# Patient Record
Sex: MALE | Race: WHITE | NOT HISPANIC OR LATINO | URBAN - METROPOLITAN AREA
[De-identification: names, ages, dates, MRNs, and addresses within clinical notes are randomized per-mention and may not be internally consistent; named-entity substitution may affect disease eponyms.]

---

## 2020-01-08 ENCOUNTER — OFFICE VISIT (OUTPATIENT)
Dept: DERMATOLOGY | Facility: CLINIC | Age: 60
End: 2020-01-08

## 2020-01-08 DIAGNOSIS — L30.9 DERMATITIS: Primary | ICD-10-CM

## 2020-01-08 RX ORDER — HYDROXYZINE HYDROCHLORIDE 25 MG/1
25-50 TABLET, FILM COATED ORAL
COMMUNITY
Start: 2020-01-02 | End: 2020-01-16

## 2020-01-08 RX ORDER — PREDNISONE 20 MG/1
TABLET ORAL
COMMUNITY
Start: 2020-01-02 | End: 2020-01-08

## 2020-01-08 RX ORDER — TRIAMCINOLONE ACETONIDE 1 MG/G
OINTMENT TOPICAL 2 TIMES DAILY
Qty: 80 G | Refills: 5 | Status: SHIPPED | OUTPATIENT
Start: 2020-01-08 | End: 2024-04-27

## 2020-01-08 ASSESSMENT — PAIN SCALES - GENERAL: PAINLEVEL: NO PAIN (0)

## 2020-01-08 NOTE — LETTER
1/8/2020       RE: Cristino Borges  [address] [place] [address]     Dear Colleague,    Thank you for referring your patient, Cristino Borges, to the [facility] DERMATOLOGY at [facility]. Please see a copy of my visit note below.    [facility] Dermatology Note    Dermatology Problem List:  1. Eczematous eruption on the extensor arms, back  -Triamcinolone 0.1% ointment BID PRN    Encounter Date: Jan 8, 2020    CC: Skin eruption    History of Present Illness:  Mr. Cristino Borges is a 59 year old male presenting for skin eruption  Was evaluated by primary care at [facility] 1/2/20 and referred for urgent dermatology visit; placed on prednisone for 3 days, hydroxyzine  Eruption on back, arms, hands; itchy in nature; present for 1 month, slightly improved with hydrocortisone, no environmental or occupational exposures, no recent travel, no fevers/chills/night sweats  Patient states that everything is much improved with prednisone, still having some mild pruritis; started on the left lower medial leg as one patch that was itchy, started about 1 months ago; felt a little bit sick with a virus about 1 month ago after returning from [place] and visiting parents; was using over-the-counter cortisone which has helped slightly; otherwise feeling wel    Past Medical History:   Otherwise healthy    Social History:  Patient reports that he has never smoked. He has never used smokeless tobacco.    Family History:  Family History   Problem Relation Age of Onset     Skin Cancer No family hx of      Melanoma No family hx of        Medications:  Current Outpatient Medications   Medication Sig Dispense Refill     hydrOXYzine (ATARAX) 25 MG tablet Take 25-50 mg by mouth       predniSONE (DELTASONE) 20 MG tablet           Allergies:  No Known Allergies    Review of Systems:  Constitutional: Otherwise feeling well today, in usual state of health.  HEENT: Patient denies  nonhealing oral sores.    Physical exam:  Vitals: There were no vitals taken for this visit.  General: In no acute distress, well-developed, well-nourished  Eyes: Conjunctivae clear  Pulmonary: Breathing comfortably in no distress  CV: Well-perfused, no cyanosis  Extremities: No deformity, no edema  Lymphatic: No lymphadenopathy    Skin:   Gavin II  -face  -neck  -abdomen  -back  -bilateral upper extremities  -bilateral lower extremities    Faint eczematous pink papules on the bilateral lower back and extensor arms with peripheral brown macules indicative of post-inflammatory hyperpigmentation    Impression/Plan:  1. Eczematous eruption on the extensor arms, back  Differential includes eczematous dermatitis including disseminated id reaction, pityriasis rosea, viral exanthem  Discussed the various possible etiologies and option to have stronger topical steroid to use if needed and possibility of rebound after stopping prednisone  -Gentle skin care  -Triamcinolone 0.1% ointment BID PRN    CC Referred Self, MD  No address on file on close of this encounter.    Follow-up as needed for new or changing lesions- He will MYCHART if reflares    Faculty: Dr. Gonzalez    Staff Involved:  Resident/Staff    Rhonda Judd MD  Dermatology Resident  [facility]  I, Layne Gonzalez MD, saw this patient with the resident and agree with the resident s findings and plan of care as documented in the resident s note.      Again, thank you for allowing me to participate in the care of your patient.      Sincerely,    Layne Gonzalez MD

## 2020-01-08 NOTE — LETTER
Date:January 10, 2020      Patient was self referred, no letter generated. Do not send.        [facility] Health Information

## 2020-01-08 NOTE — PROGRESS NOTES
[facility] Dermatology Note    Dermatology Problem List:  1. Eczematous eruption on the extensor arms, back  -Triamcinolone 0.1% ointment BID PRN    Encounter Date: Jan 8, 2020    CC: Skin eruption    History of Present Illness:  Mr. Cristino Borges is a 59 year old male presenting for skin eruption  Was evaluated by primary care at [facility] 1/2/20 and referred for urgent dermatology visit; placed on prednisone for 3 days, hydroxyzine  Eruption on back, arms, hands; itchy in nature; present for 1 month, slightly improved with hydrocortisone, no environmental or occupational exposures, no recent travel, no fevers/chills/night sweats  Patient states that everything is much improved with prednisone, still having some mild pruritis; started on the left lower medial leg as one patch that was itchy, started about 1 months ago; felt a little bit sick with a virus about 1 month ago after returning from [place] and visiting parents; was using over-the-counter cortisone which has helped slightly; otherwise feeling wel    Past Medical History:   Otherwise healthy    Social History:  Patient reports that he has never smoked. He has never used smokeless tobacco.    Family History:  Family History   Problem Relation Age of Onset     Skin Cancer No family hx of      Melanoma No family hx of        Medications:  Current Outpatient Medications   Medication Sig Dispense Refill     hydrOXYzine (ATARAX) 25 MG tablet Take 25-50 mg by mouth       predniSONE (DELTASONE) 20 MG tablet           Allergies:  No Known Allergies    Review of Systems:  Constitutional: Otherwise feeling well today, in usual state of health.  HEENT: Patient denies nonhealing oral sores.    Physical exam:  Vitals: There were no vitals taken for this visit.  General: In no acute distress, well-developed, well-nourished  Eyes: Conjunctivae clear  Pulmonary: Breathing comfortably in no distress  CV: Well-perfused, no cyanosis  Extremities:  No deformity, no edema  Lymphatic: No lymphadenopathy    Skin:   Gavin II  -face  -neck  -abdomen  -back  -bilateral upper extremities  -bilateral lower extremities    Faint eczematous pink papules on the bilateral lower back and extensor arms with peripheral brown macules indicative of post-inflammatory hyperpigmentation    Impression/Plan:  1. Eczematous eruption on the extensor arms, back  Differential includes eczematous dermatitis including disseminated id reaction, pityriasis rosea, viral exanthem  Discussed the various possible etiologies and option to have stronger topical steroid to use if needed and possibility of rebound after stopping prednisone  -Gentle skin care  -Triamcinolone 0.1% ointment BID PRN    CC Referred Self, MD  No address on file on close of this encounter.    Follow-up as needed for new or changing lesions- He will MYCHART if reflares    Faculty: Dr. Gonzalez    Staff Involved:  Resident/Staff    Rhonda Judd MD  Dermatology Resident  [facility]  I, Layne Gonzalez MD, saw this patient with the resident and agree with the resident s findings and plan of care as documented in the resident s note.

## 2020-01-08 NOTE — NURSING NOTE
Dermatology Rooming Note    Cristino Borges's goals for this visit include:   Chief Complaint   Patient presents with     Derm Problem     Cristino is here for a concerning itchy rash on his arms, lower back, and legs. states he was seen in urgent care last week. States it started to develop about 2 months ago.        Jaelyn Harris LPN

## 2020-03-15 ENCOUNTER — HEALTH MAINTENANCE LETTER (OUTPATIENT)
Age: 60
End: 2020-03-15

## 2021-01-15 ENCOUNTER — HEALTH MAINTENANCE LETTER (OUTPATIENT)
Age: 61
End: 2021-01-15

## 2021-04-22 ENCOUNTER — IMMUNIZATION (OUTPATIENT)
Dept: NURSING | Facility: CLINIC | Age: 61
End: 2021-04-22
Payer: COMMERCIAL

## 2021-04-22 PROCEDURE — 0001A PR COVID VAC PFIZER DIL RECON 30 MCG/0.3 ML IM: CPT

## 2021-04-22 PROCEDURE — 91300 PR COVID VAC PFIZER DIL RECON 30 MCG/0.3 ML IM: CPT

## 2021-05-09 ENCOUNTER — HEALTH MAINTENANCE LETTER (OUTPATIENT)
Age: 61
End: 2021-05-09

## 2021-05-13 ENCOUNTER — IMMUNIZATION (OUTPATIENT)
Dept: NURSING | Facility: CLINIC | Age: 61
End: 2021-05-13
Attending: INTERNAL MEDICINE
Payer: COMMERCIAL

## 2021-05-13 PROCEDURE — 91300 PR COVID VAC PFIZER DIL RECON 30 MCG/0.3 ML IM: CPT

## 2021-05-13 PROCEDURE — 0002A PR COVID VAC PFIZER DIL RECON 30 MCG/0.3 ML IM: CPT

## 2021-10-24 ENCOUNTER — HEALTH MAINTENANCE LETTER (OUTPATIENT)
Age: 61
End: 2021-10-24

## 2022-06-05 ENCOUNTER — HEALTH MAINTENANCE LETTER (OUTPATIENT)
Age: 62
End: 2022-06-05

## 2022-10-15 ENCOUNTER — HEALTH MAINTENANCE LETTER (OUTPATIENT)
Age: 62
End: 2022-10-15

## 2023-06-11 ENCOUNTER — HEALTH MAINTENANCE LETTER (OUTPATIENT)
Age: 63
End: 2023-06-11

## 2024-04-25 SDOH — HEALTH STABILITY: PHYSICAL HEALTH: ON AVERAGE, HOW MANY MINUTES DO YOU ENGAGE IN EXERCISE AT THIS LEVEL?: 40 MIN

## 2024-04-25 SDOH — HEALTH STABILITY: PHYSICAL HEALTH: ON AVERAGE, HOW MANY DAYS PER WEEK DO YOU ENGAGE IN MODERATE TO STRENUOUS EXERCISE (LIKE A BRISK WALK)?: 5 DAYS

## 2024-04-25 ASSESSMENT — SOCIAL DETERMINANTS OF HEALTH (SDOH): HOW OFTEN DO YOU GET TOGETHER WITH FRIENDS OR RELATIVES?: MORE THAN THREE TIMES A WEEK

## 2024-04-26 ENCOUNTER — ORDERS ONLY (AUTO-RELEASED) (OUTPATIENT)
Dept: FAMILY MEDICINE | Facility: CLINIC | Age: 64
End: 2024-04-26

## 2024-04-26 ENCOUNTER — OFFICE VISIT (OUTPATIENT)
Dept: FAMILY MEDICINE | Facility: CLINIC | Age: 64
End: 2024-04-26
Payer: COMMERCIAL

## 2024-04-26 VITALS
DIASTOLIC BLOOD PRESSURE: 84 MMHG | WEIGHT: 179 LBS | HEART RATE: 77 BPM | BODY MASS INDEX: 25.62 KG/M2 | HEIGHT: 70 IN | SYSTOLIC BLOOD PRESSURE: 152 MMHG | OXYGEN SATURATION: 99 % | TEMPERATURE: 97.8 F | RESPIRATION RATE: 19 BRPM

## 2024-04-26 DIAGNOSIS — Z11.4 SCREENING FOR HIV (HUMAN IMMUNODEFICIENCY VIRUS): ICD-10-CM

## 2024-04-26 DIAGNOSIS — Z13.1 SCREENING FOR DIABETES MELLITUS: ICD-10-CM

## 2024-04-26 DIAGNOSIS — Z11.59 NEED FOR HEPATITIS C SCREENING TEST: ICD-10-CM

## 2024-04-26 DIAGNOSIS — R03.0 ELEVATED BP WITHOUT DIAGNOSIS OF HYPERTENSION: ICD-10-CM

## 2024-04-26 DIAGNOSIS — G89.29 CHRONIC RIGHT-SIDED LOW BACK PAIN, UNSPECIFIED WHETHER SCIATICA PRESENT: ICD-10-CM

## 2024-04-26 DIAGNOSIS — R07.89 ATYPICAL CHEST PAIN: ICD-10-CM

## 2024-04-26 DIAGNOSIS — Z13.220 LIPID SCREENING: ICD-10-CM

## 2024-04-26 DIAGNOSIS — Z12.11 SCREEN FOR COLON CANCER: ICD-10-CM

## 2024-04-26 DIAGNOSIS — Z00.00 VISIT FOR PREVENTIVE HEALTH EXAMINATION: Primary | ICD-10-CM

## 2024-04-26 DIAGNOSIS — M54.50 CHRONIC RIGHT-SIDED LOW BACK PAIN, UNSPECIFIED WHETHER SCIATICA PRESENT: ICD-10-CM

## 2024-04-26 DIAGNOSIS — Z12.5 PROSTATE CANCER SCREENING: ICD-10-CM

## 2024-04-26 DIAGNOSIS — R59.0 LEFT CERVICAL LYMPHADENOPATHY: ICD-10-CM

## 2024-04-26 LAB — HBA1C MFR BLD: 5.2 % (ref 0–5.6)

## 2024-04-26 PROCEDURE — G0103 PSA SCREENING: HCPCS | Performed by: FAMILY MEDICINE

## 2024-04-26 PROCEDURE — 36415 COLL VENOUS BLD VENIPUNCTURE: CPT | Performed by: FAMILY MEDICINE

## 2024-04-26 PROCEDURE — 80061 LIPID PANEL: CPT | Performed by: FAMILY MEDICINE

## 2024-04-26 PROCEDURE — 99386 PREV VISIT NEW AGE 40-64: CPT | Performed by: FAMILY MEDICINE

## 2024-04-26 PROCEDURE — 83036 HEMOGLOBIN GLYCOSYLATED A1C: CPT | Performed by: FAMILY MEDICINE

## 2024-04-26 PROCEDURE — 80048 BASIC METABOLIC PNL TOTAL CA: CPT | Performed by: FAMILY MEDICINE

## 2024-04-26 PROCEDURE — 86803 HEPATITIS C AB TEST: CPT | Performed by: FAMILY MEDICINE

## 2024-04-26 PROCEDURE — 87389 HIV-1 AG W/HIV-1&-2 AB AG IA: CPT | Performed by: FAMILY MEDICINE

## 2024-04-26 PROCEDURE — 99213 OFFICE O/P EST LOW 20 MIN: CPT | Mod: 25 | Performed by: FAMILY MEDICINE

## 2024-04-26 ASSESSMENT — PAIN SCALES - GENERAL: PAINLEVEL: NO PAIN (0)

## 2024-04-26 NOTE — PROGRESS NOTES
Assessment/Plan.    Preventive.  See below orders for screening tests and immunizations.  Risk/benefits of PSA discussed; patient would like to check.    Elevated blood pressure.  Continue home monitoring.  If persistent elevation, will start antihypertensive.  Consider sleep apnea screening based on anatomy.    Left cervical adenopathy.  I am reassured by the fact that previous episodes of enlargement have self-resolved.  Trend.    Left lateral chest/upper back discomfort.  Suspect muscle strain.  Try stretching.    Chronic intermittent low back pain.  Suspect lumbar strain.  Link to stretching exercises provided.  Also discussed use of topical muscle creams.    Orders Placed This Encounter   Procedures    HIV Antigen Antibody Combo    Hepatitis C Screen Reflex to HCV RNA Quant and Genotype    Basic metabolic panel  (Ca, Cl, CO2, Creat, Gluc, K, Na, BUN)    Lipid panel reflex to direct LDL Non-fasting    Hemoglobin A1c    PSA, screen    COLOGUARD([employer] SCI[employer])       ----  Chief complaint: Physical and Establish Care    Social History     Social History Narrative    -Grew up in [place].  Moved to [place] in his late 20s    -Lives with wife    -Has 3 adult kids    -Works as         Updated 4/27/2024     Patient has been advised of split billing: yes.    Discomfort in left shoulder area.  Not so much in shoulder joint, but rather in lateral left pectoral area and left upper back.  This has been an issue for about 10 years, but symptoms have been more pronounced in the past year.  Mainly notices symptoms when using left arm.  Denies left arm paresthesias.  Patient is right-handed.    Elevated blood pressure.  Also noted in past.  Has never taken antihypertensive medication.  Father has hypertension.  Has upper arm monitor at home.  Initial reading is typically high, but subsequent readings will tend to be around 135/85.    Left anterior neck lump.  Fluctuates in size.  Denies recent fever, night  "sweats or weight loss.    Chronic low back pain.  First episode when lifting heavy garage door in his 30s.  Pain resolved quickly on that occasion.  Reaggravated back 3 years ago.  Had been lifting heavy objects and noticed onset of pain a few days after activity.  Pain is most pronounced in the right lumbar area.  It does not occur daily.  Staying in 1 position for a prolonged period of time is a trigger.  Occasional \"sciatica\" sensation in right lateral thigh, but this is not necessarily associated with back pain.  Denies leg paresthesias.  Denies recent bladder or bowel incontinence.  Does not wake due to pain.  Denies history of spine surgery, vertebral fracture or disc disease.    Exam  BP (!) 152/84 (BP Location: Right arm)   Pulse 77   Temp 97.8  F (36.6  C) (Temporal)   Resp 19   Ht 1.772 m (5' 9.76\")   Wt 81.2 kg (179 lb)   SpO2 99%   BMI 25.86 kg/m      Mallampati 3-4  Left cervical adenopathy  lung fields CTAB  heart with regular rate and rhythm, no murmurs  no lower leg edema    Left shoulder.  No clavicle, scapular, subacromial, anterior glenohumeral or AC joint tenderness.  Good active range of motion without discomfort.    No lumbar spinous or SI joint tenderness.  Right lumbar paraspinal spasm without tenderness.    5/5 strength b/l with flexion at hip, flexion & extension at knee, and dorsiflexion & plantarflexion at ankle  "

## 2024-04-26 NOTE — PROGRESS NOTES
Preventive Care Visit  [facility]  Sheng Cohen MD, Family Medicine  Apr 26, 2024  {Provider  Link to SmartSet :413497}    {PROVIDER CHARTING PREFERENCE:122849}    Subjective   bill is a 63 year old, presenting for the following:  Physical and Establish Care        4/26/2024    11:26 AM   Additional Questions   Roomed by Christo MURCIA        Health Care Directive  Patient does not have a Health Care Directive or Living Will: {ADVANCE_DIRECTIVE_STATUS:264421}    HPI  ***  {MA/LPN/RN Pre-Provider Visit Orders- hCG/UA/Strep (Optional):844586}  {SUPERLIST (Optional):731401}  {additonal problems for provider to add (Optional):226659}      4/25/2024   General Health   How would you rate your overall physical health? Good   Feel stress (tense, anxious, or unable to sleep) Not at all         4/25/2024   Nutrition   Three or more servings of calcium each day? Yes   Diet: Regular (no restrictions)   How many servings of fruit and vegetables per day? 4 or more   How many sweetened beverages each day? 0-1         4/25/2024   Exercise   Days per week of moderate/strenous exercise 5 days   Average minutes spent exercising at this level 40 min         4/25/2024   Social Factors   Frequency of gathering with friends or relatives More than three times a week   Worry food won't last until get money to buy more No   Food not last or not have enough money for food? No   Do you have housing?  Yes   Are you worried about losing your housing? No   Lack of transportation? No   Unable to get utilities (heat,electricity)? No         4/25/2024   Fall Risk   Fallen 2 or more times in the past year? No   Trouble with walking or balance? No          4/25/2024   Dental   Dentist two times every year? Yes         4/25/2024   TB Screening   Were you born outside of the [place]? No         Today's PHQ-2 Score:       4/25/2024     4:45 PM   PHQ-2 ( 1999 [employer])   Q1: Little interest or pleasure in doing things 0   Q2:  "Feeling down, depressed or hopeless 0   PHQ-2 Score 0   Q1: Little interest or pleasure in doing things Not at all   Q2: Feeling down, depressed or hopeless Not at all   PHQ-2 Score 0           4/25/2024   Substance Use   Alcohol more than 3/day or more than 7/wk No   Do you use any other substances recreationally? No     Social History     Tobacco Use    Smoking status: Never    Smokeless tobacco: Never   Vaping Use    Vaping status: Never Used     {Provider  If there are gaps in the social history shown above, please follow the link to update and then refresh the note Link to Social and Substance History :561136}        4/25/2024   One time HIV Screening   Previous HIV test? No         4/25/2024   STI Screening   New sexual partner(s) since last STI/HIV test? No   Last PSA: No results found for: \"PSA\"  ASCVD Risk   The ASCVD Risk score (Reddy PEACE, et al., 2019) failed to calculate for the following reasons:    Cannot find a previous HDL lab    Cannot find a previous total cholesterol lab    {Link to Fracture Risk Assessment Tool (Optional):956258}    {Provider  Use the storyboard to review patient history, after sections have been marked as reviewed, refresh note to capture documentation:656973}   Reviewed and updated as needed this visit by Provider                    {HISTORY OPTIONS (Optional):677389}    {ROS Picklists (Optional):368150}     Objective    Exam  BP (!) 166/102 (BP Location: Left arm, Patient Position: Sitting, Cuff Size: Adult Regular)   Pulse 77   Temp 97.8  F (36.6  C) (Temporal)   Resp 19   Ht 1.772 m (5' 9.76\")   Wt 81.2 kg (179 lb)   SpO2 99%   BMI 25.86 kg/m     Estimated body mass index is 25.86 kg/m  as calculated from the following:    Height as of this encounter: 1.772 m (5' 9.76\").    Weight as of this encounter: 81.2 kg (179 lb).    Physical Exam  {Exam Choices (Optional):684958}        Signed Electronically by: Sheng Cohen MD  {Email feedback regarding this " note to [email or web address]   :940257}

## 2024-04-27 PROBLEM — H40.009 GLAUCOMA SUSPECT, UNSPECIFIED LATERALITY: Status: ACTIVE | Noted: 2024-04-27

## 2024-04-27 PROBLEM — R59.0 LEFT CERVICAL LYMPHADENOPATHY: Status: ACTIVE | Noted: 2024-04-27

## 2024-04-27 PROBLEM — R03.0 ELEVATED BP WITHOUT DIAGNOSIS OF HYPERTENSION: Status: ACTIVE | Noted: 2024-04-27

## 2024-04-27 PROBLEM — G89.29 CHRONIC RIGHT-SIDED LOW BACK PAIN, UNSPECIFIED WHETHER SCIATICA PRESENT: Status: ACTIVE | Noted: 2024-04-27

## 2024-04-27 PROBLEM — M54.50 CHRONIC RIGHT-SIDED LOW BACK PAIN, UNSPECIFIED WHETHER SCIATICA PRESENT: Status: ACTIVE | Noted: 2024-04-27

## 2024-04-27 LAB
ANION GAP SERPL CALCULATED.3IONS-SCNC: 14 MMOL/L (ref 7–15)
BUN SERPL-MCNC: 9.8 MG/DL (ref 8–23)
CALCIUM SERPL-MCNC: 9.6 MG/DL (ref 8.8–10.2)
CHLORIDE SERPL-SCNC: 103 MMOL/L (ref 98–107)
CHOLEST SERPL-MCNC: 192 MG/DL
CREAT SERPL-MCNC: 0.94 MG/DL (ref 0.67–1.17)
DEPRECATED HCO3 PLAS-SCNC: 25 MMOL/L (ref 22–29)
EGFRCR SERPLBLD CKD-EPI 2021: >90 ML/MIN/1.73M2
FASTING STATUS PATIENT QL REPORTED: NO
GLUCOSE SERPL-MCNC: 97 MG/DL (ref 70–99)
HCV AB SERPL QL IA: NONREACTIVE
HDLC SERPL-MCNC: 59 MG/DL
HIV 1+2 AB+HIV1 P24 AG SERPL QL IA: NONREACTIVE
LDLC SERPL CALC-MCNC: 114 MG/DL
NONHDLC SERPL-MCNC: 133 MG/DL
POTASSIUM SERPL-SCNC: 4.4 MMOL/L (ref 3.4–5.3)
PSA SERPL DL<=0.01 NG/ML-MCNC: 0.54 NG/ML (ref 0–4.5)
SODIUM SERPL-SCNC: 142 MMOL/L (ref 135–145)
TRIGL SERPL-MCNC: 94 MG/DL

## 2024-05-05 PROBLEM — E78.00 HIGH CHOLESTEROL: Status: ACTIVE | Noted: 2024-05-05

## 2024-05-19 LAB — NONINV COLON CA DNA+OCC BLD SCRN STL QL: NEGATIVE

## 2025-06-14 ENCOUNTER — HEALTH MAINTENANCE LETTER (OUTPATIENT)
Age: 65
End: 2025-06-14